# Patient Record
Sex: FEMALE | Race: WHITE | NOT HISPANIC OR LATINO | URBAN - METROPOLITAN AREA
[De-identification: names, ages, dates, MRNs, and addresses within clinical notes are randomized per-mention and may not be internally consistent; named-entity substitution may affect disease eponyms.]

---

## 2023-08-24 ENCOUNTER — APPOINTMENT (OUTPATIENT)
Dept: URBAN - METROPOLITAN AREA CLINIC 306 | Age: 33
Setting detail: DERMATOLOGY
End: 2023-08-25

## 2023-08-24 DIAGNOSIS — Z41.9 ENCOUNTER FOR PROCEDURE FOR PURPOSES OTHER THAN REMEDYING HEALTH STATE, UNSPECIFIED: ICD-10-CM

## 2023-08-24 PROCEDURE — OTHER JEUVEAU (U OR CC): OTHER

## 2023-08-24 PROCEDURE — OTHER JEUVEAU (U OR CC) ADDITIVE: OTHER

## 2023-08-24 NOTE — PROCEDURE: JEUVEAU (U OR CC)
Show Inventory Tab: Show
Dilution (U/0.1 Cc): 0.1
Post-Care Instructions: Patient instructed to not lie down for 4 hours and limit physical activity for 24 hours.
Measure In Units Or Cc's?: units
Detail Level: Detailed
Consent: Written consent obtained. Risks include but not limited to lid/brow ptosis, bruising, swelling, diplopia, temporary effect, incomplete chemical denervation.